# Patient Record
Sex: MALE | Race: WHITE | NOT HISPANIC OR LATINO | URBAN - METROPOLITAN AREA
[De-identification: names, ages, dates, MRNs, and addresses within clinical notes are randomized per-mention and may not be internally consistent; named-entity substitution may affect disease eponyms.]

---

## 2017-11-02 ENCOUNTER — EMERGENCY (EMERGENCY)
Facility: HOSPITAL | Age: 37
LOS: 1 days | Discharge: HOME | End: 2017-11-02
Attending: EMERGENCY MEDICINE
Payer: COMMERCIAL

## 2017-11-02 VITALS
HEIGHT: 72 IN | HEART RATE: 99 BPM | OXYGEN SATURATION: 95 % | RESPIRATION RATE: 18 BRPM | TEMPERATURE: 98 F | SYSTOLIC BLOOD PRESSURE: 119 MMHG | DIASTOLIC BLOOD PRESSURE: 81 MMHG | WEIGHT: 250 LBS

## 2017-11-02 PROCEDURE — 99283 EMERGENCY DEPT VISIT LOW MDM: CPT | Mod: 25

## 2017-11-02 PROCEDURE — 73030 X-RAY EXAM OF SHOULDER: CPT | Mod: 26,RT

## 2017-11-02 PROCEDURE — 99053 MED SERV 10PM-8AM 24 HR FAC: CPT

## 2017-11-02 PROCEDURE — 73030 X-RAY EXAM OF SHOULDER: CPT
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
## 2017-11-02 NOTE — ED PROVIDER NOTE - LOCATION
shoulder/FROM, N/V intact, 5+ strength RUE, - edema, - clavicular tenderness, - scapula tenderness, - obvious deformities

## 2017-11-02 NOTE — ED PROVIDER NOTE - PROGRESS NOTE DETAILS
xray: shows no acute fracture xray: shows no acute fracture. PT denies pain at this time. - ac tenderness. PT OK to d/c and follow up with Orthopedic.

## 2017-11-02 NOTE — ED PROVIDER NOTE - ATTENDING CONTRIBUTION TO CARE
37y old , injury to shoulder, previous h/o same, distal sensory, motor and capillary , pain control, xray review, close follow up
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
## 2018-03-09 ENCOUNTER — EMERGENCY (EMERGENCY)
Facility: HOSPITAL | Age: 38
LOS: 1 days | Discharge: HOME | End: 2018-03-09
Attending: EMERGENCY MEDICINE | Admitting: EMERGENCY MEDICINE
Payer: COMMERCIAL

## 2018-03-09 VITALS
SYSTOLIC BLOOD PRESSURE: 155 MMHG | TEMPERATURE: 98 F | OXYGEN SATURATION: 100 % | RESPIRATION RATE: 18 BRPM | DIASTOLIC BLOOD PRESSURE: 97 MMHG | HEART RATE: 71 BPM

## 2018-03-09 PROCEDURE — 99283 EMERGENCY DEPT VISIT LOW MDM: CPT | Mod: 25

## 2018-03-09 PROCEDURE — 73000 X-RAY EXAM OF COLLAR BONE: CPT

## 2018-03-09 PROCEDURE — 73030 X-RAY EXAM OF SHOULDER: CPT | Mod: 26,RT

## 2018-03-09 PROCEDURE — 73030 X-RAY EXAM OF SHOULDER: CPT

## 2018-03-09 PROCEDURE — 73000 X-RAY EXAM OF COLLAR BONE: CPT | Mod: 26,RT

## 2018-03-09 PROCEDURE — 99053 MED SERV 10PM-8AM 24 HR FAC: CPT

## 2018-03-09 RX ORDER — IBUPROFEN 200 MG
1 TABLET ORAL
Qty: 20 | Refills: 0
Start: 2018-03-09 | End: 2018-03-13

## 2018-03-09 RX ORDER — IBUPROFEN 200 MG
600 TABLET ORAL ONCE
Qty: 0 | Refills: 0 | Status: COMPLETED | OUTPATIENT
Start: 2018-03-09 | End: 2018-03-09

## 2018-03-09 RX ORDER — IBUPROFEN 200 MG
1 TABLET ORAL
Qty: 20 | Refills: 0
Start: 2018-03-09 | End: 2019-11-17

## 2018-03-09 RX ADMIN — Medication 600 MILLIGRAM(S): at 05:33

## 2018-03-09 NOTE — ED ADULT NURSE NOTE - OBJECTIVE STATEMENT
PT with PMHx of Rt shoulder separation presents to the ED with Rt shoulder pain S/P falling directly on RT shoulder. PT states that he slipped on ice while at worked and landed directly on his shoulder. PT presents to the ED with Rt shoulder pain S/P falling directly on RT shoulder. PT states that he slipped on ice while at worked and landed directly on his shoulder. Patient denies LOC, limited ROM noted, pain 6/10. Reports HX of similar episodes.

## 2018-03-09 NOTE — ED PROCEDURE NOTE - ATTENDING CONTRIBUTION TO CARE
I personally saw the patient with the PA, and completed the key components of the history and physical exam. I then discussed the management plan with the PA.  I also agree with procedure as documented

## 2018-03-09 NOTE — ED PROVIDER NOTE - MEDICAL DECISION MAKING DETAILS
TX pain, xray shoulder, likely DC home pending neg xray, with NSAIDs, follow up to ortho and PCP, pt informed that radiology results can change after official read, educated about proper follow up, tx, and when to return to the ED if needed. PT verbalizes that he understands all DX, information, and instructions. TX pain, xray shoulder, likely DC home pending neg xray, with NSAIDs, follow up to ortho sling,  and PCP, pt informed that radiology results can change after official read, educated about proper follow up, tx, and when to return to the ED if needed. PT verbalizes that he understands all DX, information, and instructions.

## 2018-03-09 NOTE — ED PROVIDER NOTE - OBJECTIVE STATEMENT
PT with PMHx of Rt shoulder separation presents to the ED with Rt shoulder pain S/P falling directly on RT shoulder. PT states that he slipped on ice while at worked and landed directly on his shoulder. PT states that he did nothing to make it better and that it progressively got worse so he came to the ED. PT denies Head trama, LOC, neck pain, back pain, numbness, tingling, weakness's, N/V, change in eye sight.

## 2018-03-09 NOTE — ED PROVIDER NOTE - ATTENDING CONTRIBUTION TO CARE
I personally saw the patient with the PA, and completed the key components of the history and physical exam. I then discussed the management plan with the PA.   gen in nad resp cleear caradac no murmur abd sof tmsk + ttp right anterior shoulder no signs septic joint

## 2018-03-09 NOTE — ED PROCEDURE NOTE - PROCEDURE ADDITIONAL DETAILS
Pt informed of proper sling use, and the importance of proper use to minimize sequale of sling usage.

## 2018-03-09 NOTE — ED PROCEDURE NOTE - CPROC ED POST PROC CARE GUIDE1
Instructed patient/caregiver to follow-up with primary care physician./Instructed patient/caregiver regarding signs and symptoms of infection./Keep the cast/splint/dressing clean and dry./Verbal/written post procedure instructions were given to patient/caregiver./Elevate the injured extremity as instructed.

## 2018-03-09 NOTE — ED PROVIDER NOTE - CHPI ED SYMPTOMS NEG
no numbness/no abrasion/no back pain/no fever/no tingling/no weakness/no difficulty bearing weight/no bruising/no deformity
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
## 2018-03-10 NOTE — ED CLERICAL - CLERICAL COMMENTS
Chart accessed for follow-up call, self phone number is [facility] Department 3rd.

## 2018-10-04 ENCOUNTER — EMERGENCY (EMERGENCY)
Facility: HOSPITAL | Age: 38
LOS: 1 days | Discharge: HOME | End: 2018-10-04
Attending: STUDENT IN AN ORGANIZED HEALTH CARE EDUCATION/TRAINING PROGRAM
Payer: COMMERCIAL

## 2018-10-04 VITALS
OXYGEN SATURATION: 96 % | SYSTOLIC BLOOD PRESSURE: 120 MMHG | HEART RATE: 84 BPM | DIASTOLIC BLOOD PRESSURE: 85 MMHG | WEIGHT: 229.94 LBS | RESPIRATION RATE: 16 BRPM | TEMPERATURE: 98 F | HEIGHT: 72 IN

## 2018-10-04 PROBLEM — S43.006A UNSPECIFIED DISLOCATION OF UNSPECIFIED SHOULDER JOINT, INITIAL ENCOUNTER: Chronic | Status: ACTIVE | Noted: 2017-11-02

## 2018-10-04 PROCEDURE — 99283 EMERGENCY DEPT VISIT LOW MDM: CPT | Mod: 25

## 2018-10-04 PROCEDURE — 90715 TDAP VACCINE 7 YRS/> IM: CPT

## 2018-10-04 PROCEDURE — 99053 MED SERV 10PM-8AM 24 HR FAC: CPT

## 2018-10-04 PROCEDURE — 90471 IMMUNIZATION ADMIN: CPT

## 2018-10-04 RX ORDER — TETANUS TOXOID, REDUCED DIPHTHERIA TOXOID AND ACELLULAR PERTUSSIS VACCINE, ADSORBED 5; 2.5; 8; 8; 2.5 [IU]/.5ML; [IU]/.5ML; UG/.5ML; UG/.5ML; UG/.5ML
0.5 SUSPENSION INTRAMUSCULAR ONCE
Qty: 0 | Refills: 0 | Status: COMPLETED | OUTPATIENT
Start: 2018-10-04 | End: 2018-10-04

## 2018-10-04 RX ORDER — IBUPROFEN 200 MG
800 TABLET ORAL ONCE
Qty: 0 | Refills: 0 | Status: COMPLETED | OUTPATIENT
Start: 2018-10-04 | End: 2018-10-04

## 2018-10-04 RX ADMIN — Medication 800 MILLIGRAM(S): at 04:18

## 2018-10-04 RX ADMIN — TETANUS TOXOID, REDUCED DIPHTHERIA TOXOID AND ACELLULAR PERTUSSIS VACCINE, ADSORBED 0.5 MILLILITER(S): 5; 2.5; 8; 8; 2.5 SUSPENSION INTRAMUSCULAR at 04:18
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
## 2018-10-04 NOTE — ED PROVIDER NOTE - PHYSICAL EXAMINATION
MOUTH: Abrasion to right upper lip. no loose teeth. oropharynx unremarkable.   NOSE: no epistaxis.  EYES: NITHYA. EOMI bilaterally  EARS: no hemotympaneum   LEFT FOOT: no apparent deformities. + ttp over left posterior tibial region. no calf tenderness. + dorsiflexion with calf squeeze. 2+ DP/PT brisk caprefill
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
## 2019-11-13 ENCOUNTER — EMERGENCY (EMERGENCY)
Facility: HOSPITAL | Age: 39
LOS: 1 days | Discharge: HOME | End: 2019-11-13
Attending: EMERGENCY MEDICINE
Payer: COMMERCIAL

## 2019-11-13 VITALS
WEIGHT: 250 LBS | HEART RATE: 74 BPM | HEIGHT: 73 IN | TEMPERATURE: 98 F | RESPIRATION RATE: 16 BRPM | DIASTOLIC BLOOD PRESSURE: 93 MMHG | OXYGEN SATURATION: 98 % | SYSTOLIC BLOOD PRESSURE: 148 MMHG

## 2019-11-13 PROCEDURE — 99053 MED SERV 10PM-8AM 24 HR FAC: CPT

## 2019-11-13 PROCEDURE — 90471 IMMUNIZATION ADMIN: CPT

## 2019-11-13 PROCEDURE — 99285 EMERGENCY DEPT VISIT HI MDM: CPT | Mod: 25

## 2019-11-13 PROCEDURE — 90715 TDAP VACCINE 7 YRS/> IM: CPT

## 2019-11-13 PROCEDURE — 73080 X-RAY EXAM OF ELBOW: CPT | Mod: 26,RT

## 2019-11-13 PROCEDURE — 73080 X-RAY EXAM OF ELBOW: CPT

## 2019-11-13 PROCEDURE — 99283 EMERGENCY DEPT VISIT LOW MDM: CPT

## 2019-11-13 RX ORDER — TETANUS TOXOID, REDUCED DIPHTHERIA TOXOID AND ACELLULAR PERTUSSIS VACCINE, ADSORBED 5; 2.5; 8; 8; 2.5 [IU]/.5ML; [IU]/.5ML; UG/.5ML; UG/.5ML; UG/.5ML
0.5 SUSPENSION INTRAMUSCULAR ONCE
Refills: 0 | Status: COMPLETED | OUTPATIENT
Start: 2019-11-13 | End: 2019-11-13

## 2019-11-13 RX ORDER — IBUPROFEN 200 MG
600 TABLET ORAL ONCE
Refills: 0 | Status: COMPLETED | OUTPATIENT
Start: 2019-11-13 | End: 2019-11-13

## 2019-11-13 RX ADMIN — Medication 600 MILLIGRAM(S): at 23:27

## 2019-11-13 RX ADMIN — TETANUS TOXOID, REDUCED DIPHTHERIA TOXOID AND ACELLULAR PERTUSSIS VACCINE, ADSORBED 0.5 MILLILITER(S): 5; 2.5; 8; 8; 2.5 SUSPENSION INTRAMUSCULAR at 23:26
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
## 2019-11-13 NOTE — ED PROVIDER NOTE - CARE PROVIDER_API CALL
Evangelist Peterson (DO)  [facility]  [address]  [place] [address]  Phone: [phone]  Fax: [phone]  Follow Up Time:

## 2019-11-13 NOTE — ED PROVIDER NOTE - PATIENT PORTAL LINK FT
You can access the FollowMyHealth Patient Portal offered by [facility] by registering at the following website: [email or web address]. By joining [employer]’s FollowMyHealth portal, you will also be able to view your health information using other applications (apps) compatible with our system.
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
## 2019-11-13 NOTE — ED PROVIDER NOTE - NSFOLLOWUPINSTRUCTIONS_ED_ALL_ED_FT
rest ice and elevate   motrin every 6 hours to decrease inflammation  for continued symptoms please follow with orthopedics

## 2019-11-13 NOTE — ED PROVIDER NOTE - MUSCULOSKELETAL, MLM
right elbow no bruising abrasions or swelling. no palpable deformity or effusion. + ttp over medial epicondyle FROM 5/5 muscle strength. 2+ radial pulse knees with bilateral abrasions. no palpable deformity FROM . range of motion is not limited, no muscle or joint tenderness
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
## 2020-09-07 ENCOUNTER — EMERGENCY (EMERGENCY)
Facility: HOSPITAL | Age: 40
LOS: 1 days | Discharge: HOME | End: 2020-09-07
Attending: EMERGENCY MEDICINE
Payer: COMMERCIAL

## 2020-09-07 VITALS — SYSTOLIC BLOOD PRESSURE: 129 MMHG | DIASTOLIC BLOOD PRESSURE: 90 MMHG | HEART RATE: 78 BPM

## 2020-09-07 VITALS
TEMPERATURE: 98 F | HEART RATE: 86 BPM | DIASTOLIC BLOOD PRESSURE: 106 MMHG | OXYGEN SATURATION: 95 % | SYSTOLIC BLOOD PRESSURE: 159 MMHG | RESPIRATION RATE: 18 BRPM

## 2020-09-07 PROCEDURE — 99053 MED SERV 10PM-8AM 24 HR FAC: CPT

## 2020-09-07 PROCEDURE — 73660 X-RAY EXAM OF TOE(S): CPT

## 2020-09-07 PROCEDURE — 99283 EMERGENCY DEPT VISIT LOW MDM: CPT

## 2020-09-07 PROCEDURE — 73660 X-RAY EXAM OF TOE(S): CPT | Mod: 26,LT

## 2020-09-07 RX ORDER — IBUPROFEN 200 MG
600 TABLET ORAL ONCE
Refills: 0 | Status: COMPLETED | OUTPATIENT
Start: 2020-09-07 | End: 2020-09-07

## 2020-09-07 RX ADMIN — Medication 600 MILLIGRAM(S): at 02:11

## 2020-09-07 NOTE — ED PROVIDER NOTE - CARE PROVIDER_API CALL
Randy, Peter J  PODIATRIC [facility]ICINE [facility] SURGERY  [address] [place] [address]  Phone: [phone]  Fax: [phone]  Follow Up Time:

## 2020-09-07 NOTE — ED ADULT TRIAGE NOTE - CHIEF COMPLAINT QUOTE
patient states that he believes he kicked a curb while at work and broke the 2nd digit on his left toe, patient states that he has pain when he walks. states that it is turning purple
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
## 2020-09-07 NOTE — ED PROVIDER NOTE - CARE PLAN
Principal Discharge DX:	Closed displaced fracture of phalanx of toe of left foot, unspecified toe, initial encounter Principal Discharge DX:	Contusion of toe of left foot, unspecified toe, initial encounter

## 2020-09-07 NOTE — ED PROVIDER NOTE - PATIENT PORTAL LINK FT
You can access the FollowMyHealth Patient Portal offered by [facility] by registering at the following website: [email or web address]. By joining [employer]’s FollowMyHealth portal, you will also be able to view your health information using other applications (apps) compatible with our system.

## 2020-09-07 NOTE — ED PROVIDER NOTE - PROGRESS NOTE DETAILS
x-ray is reviewed by dr brown , no acute finding .given the surgical to the pt to wear . and f.u podiatry

## 2020-09-07 NOTE — ED PROVIDER NOTE - PRINCIPAL DIAGNOSIS
Closed displaced fracture of phalanx of toe of left foot, unspecified toe, initial encounter Contusion of toe of left foot, unspecified toe, initial encounter

## 2020-09-07 NOTE — ED PROVIDER NOTE - ATTENDING CONTRIBUTION TO CARE
I personally saw the patient with the PA, and completed the key components of the history and physical exam. I then discussed the management plan with the PA.   gen in nad resp clear cardiac no murmur abd soft msk + ttp left 2nd digit LLE neurovasc intact   rice therapy, risk of occult fx explained

## 2020-09-07 NOTE — ED PROVIDER NOTE - NSFOLLOWUPINSTRUCTIONS_ED_ALL_ED_FT
rest , use the surgical shoes as given to walk , apply cold pack and use the over the counter Advil as need it for the pain and follow instruction   call and follow up with podiatry within 2-3 days   you will get call back if the official xray reading is different as need told to you

## 2020-09-07 NOTE — ED ADULT NURSE NOTE - OBJECTIVE STATEMENT
patient a&ox4, VSS except slightly hypertensive, came in w/ complaints of L 2nd toe pain, patient believes he kicked a curb while @ work, noticed his toe was turning purple. no complaints of sob, n/v/d, cp or ha. pt pending Xray of foot.

## 2020-09-07 NOTE — ED PROVIDER NOTE - CONDITION AT DISCHARGE:
Detail Level: Detailed Include Pregnancy/Lactation Warning?: No Doxycycline Pregnancy And Lactation Text: This medication is Pregnancy Category D and not consider safe during pregnancy. It is also excreted in breast milk but is considered safe for shorter treatment courses. Isotretinoin Pregnancy And Lactation Text: This medication is Pregnancy Category X and is considered extremely dangerous during pregnancy. It is unknown if it is excreted in breast milk. Birth Control Pills Pregnancy And Lactation Text: This medication should be avoided if pregnant and for the first 30 days post-partum. Tetracycline Pregnancy And Lactation Text: This medication is Pregnancy Category D and not consider safe during pregnancy. It is also excreted in breast milk. Topical Retinoid counseling:  Patient advised to apply a pea-sized amount only at bedtime and wait 30 minutes after washing their face before applying. If too drying, patient may add a non-comedogenic moisturizer. The patient verbalized understanding of the proper use and possible adverse effects of retinoids. All of the patient's questions and concerns were addressed. Topical Clindamycin Counseling: Patient counseled that this medication may cause skin irritation or allergic reactions. In the event of skin irritation, the patient was advised to reduce the amount of the drug applied or use it less frequently. The patient verbalized understanding of the proper use and possible adverse effects of clindamycin. All of the patient's questions and concerns were addressed. Topical Sulfur Applications Pregnancy And Lactation Text: This medication is Pregnancy Category C and has an unknown safety profile during pregnancy. It is unknown if this topical medication is excreted in breast milk. Azithromycin Pregnancy And Lactation Text: This medication is considered safe during pregnancy and is also secreted in breast milk. Spironolactone Counseling: Patient advised regarding risks of diarrhea, abdominal pain, hyperkalemia, birth defects (for female patients), liver toxicity and renal toxicity. The patient may need blood work to monitor liver and kidney function and potassium levels while on therapy. The patient verbalized understanding of the proper use and possible adverse effects of spironolactone. All of the patient's questions and concerns were addressed. Benzoyl Peroxide Counseling: Patient counseled that medicine may cause skin irritation and bleach clothing. In the event of skin irritation, the patient was advised to reduce the amount of the drug applied or use it less frequently. The patient verbalized understanding of the proper use and possible adverse effects of benzoyl peroxide. All of the patient's questions and concerns were addressed. Dapsone Counseling: I discussed with the patient the risks of dapsone including but not limited to hemolytic anemia, agranulocytosis, rashes, methemoglobinemia, kidney failure, peripheral neuropathy, headaches, GI upset, and liver toxicity. Patients who start dapsone require monitoring including baseline LFTs and weekly CBCs for the first month, then every month thereafter. The patient verbalized understanding of the proper use and possible adverse effects of dapsone. All of the patient's questions and concerns were addressed. Topical Retinoid Pregnancy And Lactation Text: This medication is Pregnancy Category C. It is unknown if this medication is excreted in breast milk. Topical Clindamycin Pregnancy And Lactation Text: This medication is Pregnancy Category B and is considered safe during pregnancy. It is unknown if it is excreted in breast milk. High Dose Vitamin A Counseling: Side effects reviewed, pt to contact office should one occur. Bactrim Counseling:  I discussed with the patient the risks of sulfa antibiotics including but not limited to GI upset, allergic reaction, drug rash, diarrhea, dizziness, photosensitivity, and yeast infections. Rarely, more serious reactions can occur including but not limited to aplastic anemia, agranulocytosis, methemoglobinemia, blood dyscrasias, liver or kidney failure, lung infiltrates or desquamative/blistering drug rashes. Dapsone Pregnancy And Lactation Text: This medication is Pregnancy Category C and is not considered safe during pregnancy or breast feeding. Erythromycin Pregnancy And Lactation Text: This medication is Pregnancy Category B and is considered safe during pregnancy. It is also excreted in breast milk. Topical Sulfur Applications Counseling: Topical Sulfur Counseling: Patient counseled that this medication may cause skin irritation or allergic reactions. In the event of skin irritation, the patient was advised to reduce the amount of the drug applied or use it less frequently. The patient verbalized understanding of the proper use and possible adverse effects of topical sulfur application. All of the patient's questions and concerns were addressed. High Dose Vitamin A Pregnancy And Lactation Text: High dose vitamin A therapy is contraindicated during pregnancy and breast feeding. Spironolactone Pregnancy And Lactation Text: This medication can cause feminization of the male fetus and should be avoided during pregnancy. The active metabolite is also found in breast milk. Bactrim Pregnancy And Lactation Text: This medication is Pregnancy Category D and is known to cause fetal risk. It is also excreted in breast milk. Tazorac Counseling:  Patient advised that medication is irritating and drying. Patient may need to apply sparingly and wash off after an hour before eventually leaving it on overnight. The patient verbalized understanding of the proper use and possible adverse effects of tazorac. All of the patient's questions and concerns were addressed. Erythromycin Counseling:  I discussed with the patient the risks of erythromycin including but not limited to GI upset, allergic reaction, drug rash, diarrhea, increase in liver enzymes, and yeast infections. Minocycline Counseling: Patient advised regarding possible photosensitivity and discoloration of the teeth, skin, lips, tongue and gums. Patient instructed to avoid sunlight, if possible. When exposed to sunlight, patients should wear protective clothing, sunglasses, and sunscreen. The patient was instructed to call the office immediately if the following severe adverse effects occur:  hearing changes, easy bruising/bleeding, severe headache, or vision changes. The patient verbalized understanding of the proper use and possible adverse effects of minocycline. All of the patient's questions and concerns were addressed. Tetracycline Counseling: Patient counseled regarding possible photosensitivity and increased risk for sunburn. Patient instructed to avoid sunlight, if possible. When exposed to sunlight, patients should wear protective clothing, sunglasses, and sunscreen. The patient was instructed to call the office immediately if the following severe adverse effects occur:  hearing changes, easy bruising/bleeding, severe headache, or vision changes. The patient verbalized understanding of the proper use and possible adverse effects of tetracycline. All of the patient's questions and concerns were addressed. Patient understands to avoid pregnancy while on therapy due to potential birth defects. Doxycycline Counseling:  Patient counseled regarding possible photosensitivity and increased risk for sunburn. Patient instructed to avoid sunlight, if possible. When exposed to sunlight, patients should wear protective clothing, sunglasses, and sunscreen. The patient was instructed to call the office immediately if the following severe adverse effects occur:  hearing changes, easy bruising/bleeding, severe headache, or vision changes. The patient verbalized understanding of the proper use and possible adverse effects of doxycycline. All of the patient's questions and concerns were addressed. Birth Control Pills Counseling: Birth Control Pill Counseling: I discussed with the patient the potential side effects of OCPs including but not limited to increased risk of stroke, heart attack, thrombophlebitis, deep venous thrombosis, hepatic adenomas, breast changes, GI upset, headaches, and depression. The patient verbalized understanding of the proper use and possible adverse effects of OCPs. All of the patient's questions and concerns were addressed. Tazorac Pregnancy And Lactation Text: This medication is not safe during pregnancy. It is unknown if this medication is excreted in breast milk. Isotretinoin Counseling: Patient should get monthly blood tests, not donate blood, not drive at night if vision affected, not share medication, and not undergo elective surgery for 6 months after tx completed. Side effects reviewed, pt to contact office should one occur. Azithromycin Counseling:  I discussed with the patient the risks of azithromycin including but not limited to GI upset, allergic reaction, drug rash, diarrhea, and yeast infections. Benzoyl Peroxide Pregnancy And Lactation Text: This medication is Pregnancy Category C. It is unknown if benzoyl peroxide is excreted in breast milk. Improved
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
## 2021-03-03 NOTE — ED PROVIDER NOTE - RADIATION
Pt on covid call list, chart reviewed in preparation of call. Per chart pt was transferred to in pt hospice in [facility]. ( already a hospice pt). Did not call pt.
no radiation

## 2021-07-08 ENCOUNTER — EMERGENCY (EMERGENCY)
Facility: HOSPITAL | Age: 41
LOS: 1 days | Discharge: HOME | End: 2021-07-08
Attending: STUDENT IN AN ORGANIZED HEALTH CARE EDUCATION/TRAINING PROGRAM
Payer: COMMERCIAL

## 2021-07-08 VITALS
SYSTOLIC BLOOD PRESSURE: 147 MMHG | HEIGHT: 73 IN | RESPIRATION RATE: 18 BRPM | TEMPERATURE: 98 F | DIASTOLIC BLOOD PRESSURE: 100 MMHG | OXYGEN SATURATION: 98 % | HEART RATE: 97 BPM

## 2021-07-08 PROCEDURE — 73110 X-RAY EXAM OF WRIST: CPT

## 2021-07-08 PROCEDURE — 73110 X-RAY EXAM OF WRIST: CPT | Mod: 26,RT

## 2021-07-08 PROCEDURE — 99283 EMERGENCY DEPT VISIT LOW MDM: CPT

## 2021-07-08 PROCEDURE — 99053 MED SERV 10PM-8AM 24 HR FAC: CPT

## 2021-07-08 PROCEDURE — 99283 EMERGENCY DEPT VISIT LOW MDM: CPT | Mod: 25

## 2021-07-08 RX ORDER — IBUPROFEN 200 MG
600 TABLET ORAL ONCE
Refills: 0 | Status: DISCONTINUED | OUTPATIENT
Start: 2021-07-08 | End: 2021-07-12

## 2021-07-08 NOTE — ED PROVIDER NOTE - NSFOLLOWUPINSTRUCTIONS_ED_ALL_ED_FT
- Please follow up with your Primary Care Doctor in 1 - 2 days. If you cannot follow-up with your primary care doctor please return to the Emergency Department for any urgent issues.  - Seek immediate medical care for any new, worsening or concerning signs or symptoms.   - Follow up with Orthopedist   - Your blood pressure reading was high while in ED, please monitor your blood pressure at home and follow up with PMD.     - If you have difficulty following up, please call: [phone] or go to [email or web address] to obtain a [facility] doctor or specialist who takes your insurance in your area.    Feel better!     Wrist Pain, Adult    There are many things that can cause wrist pain. Some common causes include:  •An injury to the wrist.      •Using the joint too much.      •A condition that causes too much pressure to be put on a nerve in the wrist (carpal tunnel syndrome).      •Wear and tear of the joints that happens as a person gets older (osteoarthritis).      •A condition that causes swelling and stiffness in the joints (arthritis).      Sometimes, the cause of wrist pain is not known.    Often, the pain goes away when you follow your doctor's instructions for easing pain at home. This may include resting your wrist, icing your wrist, or using a splint or an elastic wrap for a short time. It is important to tell your doctor if your wrist pain does not go away.      Follow these instructions at home:    If you have a splint or elastic wrap:     •Wear the splint or wrap as told by your doctor. Take it off only as told by your doctor. Ask if you can take it off for bathing.    •Loosen the splint or wrap if your fingers:  •Tingle.      •Become numb.      •Turn cold and blue.        •Check the skin around the splint or wrap every day. Tell your doctor about any concerns.      •Keep the splint or wrap clean.    •If the splint or wrap is not waterproof:  •Do not let it get wet.      •Cover it with a watertight covering when you take a bath or shower.          Managing pain, stiffness, and swelling    •If told, put ice on the painful area. To do this:  •If you have a removable splint or wrap, take it off as told by your doctor.      •Put ice in a plastic bag.      •Place a towel between your skin and the bag or between your splint or wrap and the bag.      •Leave the ice on for 20 minutes, 2–3 times a day.        •Move your fingers often.      •Raise (elevate) the injured area above the level of your heart while you are sitting or lying down.      Activity     •Rest your wrist as told by your doctor.      •Return to your normal activities as told by your doctor. Ask your doctor what activities are safe for you.      •Ask your doctor when it is safe to drive if you have a splint or wrap on your wrist.      •Do exercises as told by your doctor.      General instructions     •Pay attention to any changes in your symptoms.      •Take over-the-counter and prescription medicines only as told by your doctor.      •Keep all follow-up visits as told by your doctor. This is important.        Contact a doctor if:    •You have a sudden, sharp pain in the wrist, hand, or arm that is different or new.      •The swelling or bruising on your wrist or hand gets worse.    •Your skin:  •Becomes red.      •Gets a rash.      •Has open sores.        •Your pain does not get better.      •Your pain gets worse.      •You have a fever or chills.        Get help right away if:    •You lose feeling in your fingers or hand.      •Your fingers turn white, very red, or cold and blue.      •You cannot move your fingers.        Summary    •There are many things that can cause wrist pain.      •It is important to tell your doctor if your wrist pain does not go away.      •You may need to wear a splint or a wrap for a short period of time.      •Return to your normal activities as told by your doctor. Ask your doctor what activities are safe for you.      This information is not intended to replace advice given to you by your health care provider. Make sure you discuss any questions you have with your health care provider.

## 2021-07-08 NOTE — ED PROVIDER NOTE - PATIENT PORTAL LINK FT
You can access the FollowMyHealth Patient Portal offered by [facility] by registering at the following website: [email or web address]. By joining [employer]’s FollowMyHealth portal, you will also be able to view your health information using other applications (apps) compatible with our system.

## 2021-07-08 NOTE — ED PROVIDER NOTE - CARE PROVIDER_API CALL
Mari Tinajero)  Orthopedics  [address] [place] [address], [address]  [place] [address]  Phone: [phone]  Fax: [phone]  Follow Up Time: 1-3 Days

## 2021-07-08 NOTE — ED PROVIDER NOTE - CHPI ED SYMPTOMS NEG
no abrasion/no back pain/no deformity/no fever/no numbness/no stiffness/no tingling/no weakness/no bruising

## 2021-07-08 NOTE — ED PROVIDER NOTE - PHYSICAL EXAMINATION
Vital signs noted, see flowsheet.  General: NAD, well appearing and non-toxic.  HEENT: NC/AT. MMM.   Neck: Soft and supple  Cardiac: RRR. +S1/S2. Peripheral pulses 2+ and symmetric b/l.   Respiratory: Speaking in full sentences, no evidence of respiratory distress. Lungs CTA b/l, no wheezes/rhonchi/rales/stridor.   Skin: Normal color for race, no evidence of rash, ecchymosis, cyanosis or jaundice.   Neuro: Awake, alert and oriented to person/place/time/situation. Moves all extremities spontaneously and symmetrically.  No focal deficit  Psych: Normal affect     RUE: No edema or ecchymosis. Minimally tender over 2nd proximal MCP. No snuffbox tenderness. + FROM.  strong, no sensory deficits. cap refill intact

## 2021-07-08 NOTE — ED PROVIDER NOTE - CLINICAL SUMMARY MEDICAL DECISION MAKING FREE TEXT BOX
41 y/o M with PMHX ankle fracture, shoulder separation presents to ED c/o right wrist pain after lifting heavy patient while at work. Xray reviewed no fx or dislocation noted, splinted for comfort. Follow up outpatient orthopedist. Also with asymptomatic hypertension, Pt informed of elevated blood pressure reading while in ED, advised to monitor BP and follow up with PMD.

## 2021-07-08 NOTE — ED PROVIDER NOTE - ATTENDING CONTRIBUTION TO CARE
39yo male with pmh of ankle fracture, shoulder separation presents for right wrist pain after lifting heavy patient while at work. No direct trauma or fall. Took no analgesics pta. Pain worsens with movement and making tight fist. Pt denies fevers/chills, ha, loc, focal neuro deficits, cp/sob/palp, cough, abd pain/n/v/d, urinary symptoms, recent travel and sick contacts.  Const: Awake, alert and oriented. In no acute distress. Well appearing.  HEENT: NC/AT. Moist mucous membranes.  Eyes: No scleral icterus. EOMI.  Neck:. Soft and supple. Full ROM without pain.  Cardiac: Regular rate and regular rhythm. +S1/S2. Peripheral pulses 2+ and symmetric. No LE edema.  Resp: Speaking in full sentences. No evidence of respiratory distress. No wheezes, rales or rhonchi.  Abd: Soft, non-tender, non-distended. Normal bowel sounds in all 4 quadrants. No guarding or rebound.  Msk: Spine midline and non-tender. No CVAT.  No edema or ecchymosis. Minimally tender over right 2nd proximal MCP. No snuffbox tenderness. + FROM.  strong, no sensory deficits. cap refill intact  Skin: No rashes, abrasions or lacerations.  Lymph: No cervical lymphadenopathy.  Neuro: Awake, alert & oriented x 3. Moves all extremities symmetrically.  splint for comfort, no acute findings on xray, follow up

## 2021-07-08 NOTE — ED PROVIDER NOTE - OBJECTIVE STATEMENT
41 y/o M with PMHX ankle fracture, shoulder separation presents to ED c/o right wrist pain after lifting heavy patient while at work. No direct trauma or fall. Took no analgesics. Pain worsens with movement and making tight fist.

## 2022-02-18 NOTE — ED PROVIDER NOTE - CARE PLAN
CARDIAC CATHETERIZATION REPORT    : Paula Carrasco MD     Date of procedure: 2/18/22    Indication:  1. Abnormal stress test and HFrEF    Procedures:  Left heart catheterization and Coronary angiogram     Sedation/analgesia:  Midazolam IV     Time sedation was administered: 1124. I was present in the room when sedation was administered. Procedure end time: 5852  Time spent with face to face monitoring of moderate sedation: 18 minutes    Brief history:  62-year-old  male with hypertension, CASIE, hypercholesterolemia and no angina who underwent a Lexiscan stress MPI in October 2021 (ordered by PCP) revealing an LVEF of 45% with a small distal inferior reversible defect. A TTE which was performed at an outside facility was interpreted with LVEF 40 to 50%. He was referred for coronary angiography. Description of procedure: The patient presented to the Cath Lab in a(n) elective fashion. The patient was prepped and draped in a sterile manner. Timeout, airway and ASA assessment were completed. Local anesthesia with 1% lidocaine was administered and sedation/analgesia were administered as above. Access was obtained using the modified Seldinger technique and a micropuncture needle in the right radial artery. A 6 Ecuadorean slender sheath was inserted over a wire. Diagnostic angiography was performed using 5F Bowen diagnostic catheters. Coronary anatomy:  Dominance: Left  1. Left main: Angiographically unremarkable  2. Left anterior descending: Large wraparound vessel with small D1 and large D2. There is mild diffuse disease. The ostium of D2 has a focal ~50% stenosis. 3. Ramus intermedius: This is a large vessel that supplies most of the lateral wall and has minimal luminal irregularities  4. Left circumflex: This is a large codominant vessel with a large OM1, small OM 2, medium sized LPL and L PDA. Has mild diffuse disease  5. Right coronary artery:  This is a large vessel with a large RV marginal branch and a small vessel in the proximal interventricular groove      Hemodynamics:  LVEDP 12  Ao: 103/70 with a mean of 86      Hemostasis:  Transradial band was applied for patent arterial hemostasis. Complications: None  Estimated blood loss: 10 mL  Air kerma: 407 mGy  Contrast used: 70 mL    Conclusions:  1. Mild epicardial CAD  2. Normal left filling pressure  3. Normal LV systolic function by single-plane angiography      Recommendations:  1. Continue aspirin 81 mg daily  2. Continue amlodipine 10 mg daily  3. Discontinue ezetimibe and start rosuvastatin 40 mg daily  4. Discontinue atenolol and start lisinopril 10 mg daily  5. BMP in 1 week  6. TTE as outpatient due to the discrepancy of today's findings and the reported outside study  7. Follow-up with me in the office in 1 to 2 months.       Gunner Fuentes MD, [facility] [place]  Interventional Cardiology/Structural Heart Disease  Office: [phone]  Coordinator: Kitty Gannon Principal Discharge DX:	Right elbow pain  Secondary Diagnosis:	Knee injury

## 2022-03-19 ENCOUNTER — EMERGENCY (EMERGENCY)
Facility: HOSPITAL | Age: 42
LOS: 1 days | Discharge: HOME | End: 2022-03-19
Attending: STUDENT IN AN ORGANIZED HEALTH CARE EDUCATION/TRAINING PROGRAM
Payer: COMMERCIAL

## 2022-03-19 VITALS
HEART RATE: 92 BPM | HEIGHT: 73 IN | WEIGHT: 210.1 LBS | SYSTOLIC BLOOD PRESSURE: 153 MMHG | RESPIRATION RATE: 18 BRPM | TEMPERATURE: 99 F | DIASTOLIC BLOOD PRESSURE: 97 MMHG | OXYGEN SATURATION: 99 %

## 2022-03-19 PROCEDURE — 99053 MED SERV 10PM-8AM 24 HR FAC: CPT

## 2022-03-19 PROCEDURE — 99283 EMERGENCY DEPT VISIT LOW MDM: CPT

## 2022-03-19 RX ORDER — IBUPROFEN 200 MG
600 TABLET ORAL ONCE
Refills: 0 | Status: COMPLETED | OUTPATIENT
Start: 2022-03-19 | End: 2022-03-19

## 2022-03-19 RX ORDER — METHOCARBAMOL 500 MG/1
1 TABLET, FILM COATED ORAL
Qty: 15 | Refills: 0
Start: 2022-03-19

## 2022-03-19 RX ORDER — LIDOCAINE 4 G/100G
1 CREAM TOPICAL ONCE
Refills: 0 | Status: COMPLETED | OUTPATIENT
Start: 2022-03-19 | End: 2022-03-19

## 2022-03-19 RX ORDER — ACETAMINOPHEN 500 MG
975 TABLET ORAL ONCE
Refills: 0 | Status: COMPLETED | OUTPATIENT
Start: 2022-03-19 | End: 2022-03-19

## 2022-03-19 RX ORDER — IBUPROFEN 200 MG
1 TABLET ORAL
Qty: 20 | Refills: 0
Start: 2022-03-19

## 2022-03-19 RX ADMIN — Medication 600 MILLIGRAM(S): at 05:37

## 2022-03-19 RX ADMIN — Medication 975 MILLIGRAM(S): at 05:37

## 2022-03-19 RX ADMIN — LIDOCAINE 1 PATCH: 4 CREAM TOPICAL at 05:38

## 2022-03-19 NOTE — ED ADULT NURSE NOTE - OBJECTIVE STATEMENT
c/o R sided rib pain while reaching back from the front to back seat to get his flashlight. patient denies trauma. tender to touch.

## 2022-03-19 NOTE — ED PROVIDER NOTE - ATTENDING CONTRIBUTION TO CARE
42 y/o M no pmhx presents c/o right sided back pain after moving stuff /reaching in car felt a pop and has been having pain since that time. Pt denies any fall or trauma. Pt denies bladder/bowel incontinence. saddle anesthesia, neuro deficits, IVDU, fevers/chills, ha, loc, focal neuro deficits, cp/sob/palp, cough, abd pain/n/v/d, urinary symptoms, recent travel.  Const: Awake, alert and oriented. In no acute distress. Well appearing.  HEENT: NC/AT. Moist mucous membranes.  Eyes: No scleral icterus. EOMI.  Neck:. Soft and supple. Full ROM without pain.  Cardiac: Regular rate and regular rhythm. +S1/S2. Peripheral pulses 2+ and symmetric. No LE edema.  Resp: Speaking in full sentences. No evidence of respiratory distress. No wheezes, rales or rhonchi.  Abd: Soft, non-tender, non-distended. Normal bowel sounds in all 4 quadrants. No guarding or rebound.  Back: Spine midline and non-tender. No CVAT. paraspinal ttp  Skin: No rashes, abrasions or lacerations.  Lymph: No cervical lymphadenopathy.  Neuro: Awake, alert & oriented x 3. Moves all extremities symmetrically.  Pt denies bladder/bowel incontinence. saddle anesthesia, neuro deficits. No midline spine tenderness, no step offs. + Paraspinal ttp. Likely msk pain, analgesia, reassess

## 2022-03-19 NOTE — ED PROVIDER NOTE - PATIENT PORTAL LINK FT
You can access the FollowMyHealth Patient Portal offered by [facility] by registering at the following website: [email or web address]. By joining [employer]’s FollowMyHealth portal, you will also be able to view your health information using other applications (apps) compatible with our system.

## 2022-03-19 NOTE — ED PROVIDER NOTE - CLINICAL SUMMARY MEDICAL DECISION MAKING FREE TEXT BOX
Pt denies bladder/bowel incontinence. saddle anesthesia, neuro deficits. No midline spine tenderness, no step offs. + Paraspinal ttp. Likely msk pain, analgesia, reassess

## 2022-03-19 NOTE — ED ADULT TRIAGE NOTE - CHIEF COMPLAINT QUOTE
Pt ambulated into ED c/o right sided back pain after reaching into the back of his car while working this evening.

## 2022-03-19 NOTE — ED PROVIDER NOTE - OBJECTIVE STATEMENT
42 y/o M no pmhx presents c/o right sided back pain after moving stuff /reaching in car felt a pop and has been having pain since that time. incident occurred while working as . denies fall or direct trauma. pain is right sided, worse with movement or bending. denies radiation down legs, numbness, tingling, weakness, bowel/bladder incontinence , saddle anesthesia, abd pain.

## 2022-03-19 NOTE — ED PROVIDER NOTE - NS ED ATTENDING STATEMENT MOD
This was a shared visit with the RONNA. I reviewed and verified the documentation and independently performed the documented:

## 2023-04-08 ENCOUNTER — EMERGENCY (EMERGENCY)
Facility: HOSPITAL | Age: 43
LOS: 1 days | Discharge: HOME | End: 2023-04-08
Attending: EMERGENCY MEDICINE
Payer: COMMERCIAL

## 2023-04-08 VITALS
RESPIRATION RATE: 16 BRPM | TEMPERATURE: 98 F | WEIGHT: 179.9 LBS | SYSTOLIC BLOOD PRESSURE: 155 MMHG | DIASTOLIC BLOOD PRESSURE: 93 MMHG | OXYGEN SATURATION: 99 % | HEART RATE: 88 BPM

## 2023-04-08 PROCEDURE — 73130 X-RAY EXAM OF HAND: CPT

## 2023-04-08 PROCEDURE — 99053 MED SERV 10PM-8AM 24 HR FAC: CPT

## 2023-04-08 PROCEDURE — 99284 EMERGENCY DEPT VISIT MOD MDM: CPT

## 2023-04-08 PROCEDURE — 73110 X-RAY EXAM OF WRIST: CPT

## 2023-04-08 RX ORDER — IBUPROFEN 200 MG
600 TABLET ORAL ONCE
Refills: 0 | Status: DISCONTINUED | OUTPATIENT
Start: 2023-04-08 | End: 2023-04-08

## 2023-04-08 RX ORDER — IBUPROFEN 200 MG
600 TABLET ORAL ONCE
Refills: 0 | Status: COMPLETED | OUTPATIENT
Start: 2023-04-08 | End: 2023-04-08

## 2023-04-08 RX ADMIN — Medication 50 MILLIGRAM(S): at 23:54

## 2023-04-08 RX ADMIN — Medication 600 MILLIGRAM(S): at 23:54
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
## 2023-04-08 NOTE — ED PROVIDER NOTE - CONDITION AT DISCHARGE:
"Anesthesia Transfer of Care Note    Patient: Melani Sloan    Procedure(s) Performed: Procedure(s) (LRB):  REPLACEMENT, BATTERY, NEUROSTIMULATOR, VAGAL (N/A)    Patient location: [facility]    Anesthesia Type: general    Transport from OR: Transported from OR on room air with adequate spontaneous ventilation    Post pain: adequate analgesia    Post assessment: no apparent anesthetic complications    Post vital signs: stable    Level of consciousness: awake    Nausea/Vomiting: no nausea/vomiting    Complications: none    Transfer of care protocol was followed      Last vitals:   Visit Vitals  /68 (BP Location: Right arm, Patient Position: Lying)   Pulse (!) 57   Temp 36.7 °C (98.1 °F) (Temporal)   Resp 20   Ht 5' 5" (1.651 m)   Wt 57.2 kg (126 lb)   LMP 03/27/2023 (Exact Date)   SpO2 97%   Breastfeeding No   BMI 20.97 kg/m²     " Improved
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
## 2023-04-08 NOTE — ED PROVIDER NOTE - PATIENT PORTAL LINK FT
You can access the FollowMyHealth Patient Portal offered by [facility] by registering at the following website: [email or web address]. By joining [employer]’s FollowMyHealth portal, you will also be able to view your health information using other applications (apps) compatible with our system.

## 2023-04-08 NOTE — ED PROVIDER NOTE - NSFOLLOWUPINSTRUCTIONS_ED_ALL_ED_FT
- Please bring all documentation from your ED visit to any related future follow up appointment.  - Please call to schedule follow up appointment with your primary care physician within 24-48 hours.  - Please seek immediate medical attention or return to the ED for any new/worsening, signs/symptoms, or concerns

## 2023-04-08 NOTE — ED PROVIDER NOTE - CLINICAL SUMMARY MEDICAL DECISION MAKING FREE TEXT BOX
43yo M p/w numbness and shooting electric feeling in dorsum hand. pt believes sx caused by work injury. on PE, FROM of wrist/hand/fingers, neuro intact, pulses intact, no skin changes. VSS. ordered motrin. pending XR 41yo M p/w numbness and shooting electric feeling in dorsum hand. pt believes sx caused by work injury. on PE, FROM of wrist/hand/fingers, neuro intact, pulses intact, no skin changes. VSS. ordered Motrin and Prednisone. pending XR- neg for fx/DL or acute change. referred to neuro and hand surg. discussed supportive care measures and return precautions. pt verbalized understanding and agreement

## 2023-04-08 NOTE — ED ADULT NURSE NOTE - CAS ELECT INFOMATION PROVIDED
DC instructions given by the Provider and verbalized understanding. Pt remains alert and O x4. NAD noted. Resp E/U./DC instructions

## 2023-04-08 NOTE — ED PROVIDER NOTE - CARE PROVIDER_API CALL
Keaton Salter)  Orthopedics  [address], [address]  [place], [place] [id number]  Phone: [phone]  Fax: [phone]  Follow Up Time:     Quang Rivas)  Neurology  [address] [facility] [address] [facility] [address]  [place] [address]  Phone: [phone]  Fax: [phone]  Follow Up Time:

## 2023-04-08 NOTE — ED PROVIDER NOTE - NS ED ATTENDING STATEMENT MOD
oral
This was a shared visit with the RONNA. I reviewed and verified the documentation and independently performed the documented:
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
## 2023-04-09 PROCEDURE — 73110 X-RAY EXAM OF WRIST: CPT | Mod: 26,RT

## 2023-04-09 PROCEDURE — 73130 X-RAY EXAM OF HAND: CPT | Mod: 26,RT

## 2023-11-10 NOTE — ED PROVIDER NOTE - IV ALTEPLASE INCLUSION HIDDEN
Monitor: The problem is stable.  Evaluation: No labs/tests required today.  Assessment/Treatment:  Continue current treatment/monitoring regimen. Anticoagulation with Eliquis.   show

## 2023-12-17 ENCOUNTER — NON-APPOINTMENT (OUTPATIENT)
Age: 43
End: 2023-12-17

## 2023-12-20 NOTE — ED ADULT TRIAGE NOTE - HEIGHT IN CM
- Patient had colonoscopy in 2020 which had multiple benign-appearing polyps in the transverse, descending, and sigmoid colon  - At the previous colonoscopy, he was advised to repeat colonoscopy in 2 years, which she has not done yet  - Patient is due for repeat colonoscopy  - Discussed the risks and benefits of colonoscopy including but not limited to infection, bleeding, perforation in addition to risks of sedation  - Patient verbalized understanding and is willing to proceed with colonoscopy  - Currently scheduling for 2/20/2024  - Colonoscopy prep sent to his pharmacy [phone]

## 2024-02-06 NOTE — ED PROVIDER NOTE - MUSCULOSKELETAL, MLM
I attest to the pharmacy technician's note.    Carroll Adkins, PharmD  [facility] Clinical Pharmacist  [phone]   Spine appears normal, left foot 2nd phalanx with mild erythema and edema at distal joint and TTP , Cr less than 3 sec , able to move and sensation grossly intact , no ant foot and  bony on ankle TTP

## 2024-05-13 ENCOUNTER — EMERGENCY (EMERGENCY)
Facility: HOSPITAL | Age: 44
LOS: 1 days | Discharge: HOME | End: 2024-05-13
Attending: STUDENT IN AN ORGANIZED HEALTH CARE EDUCATION/TRAINING PROGRAM
Payer: COMMERCIAL

## 2024-05-13 VITALS
WEIGHT: 250 LBS | RESPIRATION RATE: 18 BRPM | SYSTOLIC BLOOD PRESSURE: 159 MMHG | HEART RATE: 73 BPM | DIASTOLIC BLOOD PRESSURE: 94 MMHG | OXYGEN SATURATION: 98 % | TEMPERATURE: 98 F

## 2024-05-13 PROCEDURE — 96372 THER/PROPH/DIAG INJ SC/IM: CPT

## 2024-05-13 PROCEDURE — 99283 EMERGENCY DEPT VISIT LOW MDM: CPT

## 2024-05-13 PROCEDURE — 99053 MED SERV 10PM-8AM 24 HR FAC: CPT

## 2024-05-13 PROCEDURE — 99284 EMERGENCY DEPT VISIT MOD MDM: CPT

## 2024-05-13 RX ORDER — METHOCARBAMOL 500 MG/1
2 TABLET, FILM COATED ORAL
Refills: 0
Start: 2024-05-13

## 2024-05-13 RX ORDER — LIDOCAINE 4 G/100G
1 CREAM TOPICAL ONCE
Refills: 0 | Status: COMPLETED | OUTPATIENT
Start: 2024-05-13 | End: 2024-05-13

## 2024-05-13 RX ORDER — IBUPROFEN 200 MG
1 TABLET ORAL
Qty: 20 | Refills: 0
Start: 2024-05-13 | End: 2024-05-17

## 2024-05-13 RX ORDER — KETOROLAC TROMETHAMINE 30 MG/ML
30 SYRINGE (ML) INJECTION ONCE
Refills: 0 | Status: DISCONTINUED | OUTPATIENT
Start: 2024-05-13 | End: 2024-05-13

## 2024-05-13 RX ORDER — METHOCARBAMOL 500 MG/1
2 TABLET, FILM COATED ORAL
Qty: 30 | Refills: 0
Start: 2024-05-13 | End: 2024-05-17

## 2024-05-13 RX ADMIN — LIDOCAINE 1 PATCH: 4 CREAM TOPICAL at 02:24

## 2024-05-13 RX ADMIN — Medication 30 MILLIGRAM(S): at 02:23

## 2024-05-13 NOTE — ED PROVIDER NOTE - NSFOLLOWUPCLINICS_GEN_ALL_ED_FT
[facility]  Orthopedics  [address] [place] [address]  Phone: [phone]  Fax:

## 2024-05-13 NOTE — ED PROVIDER NOTE - PATIENT PORTAL LINK FT
You can access the FollowMyHealth Patient Portal offered by [facility] by registering at the following website: [email or web address]. By joining [employer]’s FollowMyHealth portal, you will also be able to view your health information using other applications (apps) compatible with our system.

## 2024-05-13 NOTE — ED PROVIDER NOTE - ATTENDING APP SHARED VISIT CONTRIBUTION OF CARE
I, Kirby Blackman, personally saw the patient with the PA, and completed the key components of the history and physical exam. I then discussed the management plan with the PA.

## 2024-05-13 NOTE — ED PROVIDER NOTE - NSFOLLOWUPINSTRUCTIONS_ED_ALL_ED_FT
Follow up with PCP within 1-2 days   Take muscle relaxant as needed   Take motrin every 6 hours for pain     return if new or worsening symptoms     Back Pain    WHAT YOU NEED TO KNOW:    Back pain is common. It can be caused by many conditions, such as arthritis or the breakdown of spinal discs. Your risk for back pain is increased by injuries, lack of activity, or repeated bending and twisting. You may feel sore or stiff on one or both sides of your back. The pain may spread to your buttocks or thighs.    DISCHARGE INSTRUCTIONS:    Return to the emergency department if:     You have pain, numbness, or weakness in one or both legs.      Your pain becomes so severe that you cannot walk.      You cannot control your urine or bowel movements.      You have severe back pain with chest pain.      You have severe back pain, nausea, and vomiting.      You have severe back pain that spreads to your side or genital area.    Contact your healthcare provider if:     You have back pain that does not get better with rest and pain medicine.      You have a fever.      You have pain that worsens when you are on your back or when you rest.      You have pain that worsens when you cough or sneeze.      You lose weight without trying.      You have questions or concerns about your condition or care.    Medicines:     NSAIDs help decrease swelling and pain. This medicine is available with or without a doctor's order. NSAIDs can cause stomach bleeding or kidney problems in certain people. If you take blood thinner medicine, always ask your healthcare provider if NSAIDs are safe for you. Always read the medicine label and follow directions.      Acetaminophen decreases pain and fever. It is available without a doctor's order. Ask how much to take and how often to take it. Follow directions. Read the labels of all other medicines you are using to see if they also contain acetaminophen, or ask your doctor or pharmacist. Acetaminophen can cause liver damage if not taken correctly. Do not use more than 4 grams (4,000 milligrams) total of acetaminophen in one day.       Muscle relaxers help decrease muscle spasms and back pain.      Prescription pain medicine may be given. Ask your healthcare provider how to take this medicine safely. Some prescription pain medicines contain acetaminophen. Do not take other medicines that contain acetaminophen without talking to your healthcare provider. Too much acetaminophen may cause liver damage. Prescription pain medicine may cause constipation. Ask your healthcare provider how to prevent or treat constipation.       Take your medicine as directed. Contact your healthcare provider if you think your medicine is not helping or if you have side effects. Tell him or her if you are allergic to any medicine. Keep a list of the medicines, vitamins, and herbs you take. Include the amounts, and when and why you take them. Bring the list or the pill bottles to follow-up visits. Carry your medicine list with you in case of an emergency.    How to manage your back pain:     Apply ice on your back for 15 to 20 minutes every hour or as directed. Use an ice pack, or put crushed ice in a plastic bag. Cover it with a towel before you apply it to your skin. Ice helps prevent tissue damage and decreases pain.      Apply heat on your back for 20 to 30 minutes every 2 hours for as many days as directed. Heat helps decrease pain and muscle spasms.      Stay active as much as you can without causing more pain. Bed rest could make your back pain worse. Avoid heavy lifting until your pain is gone.      Go to physical therapy as directed. A physical therapist can teach you exercises to help improve movement and strength, and to decrease pain.    Follow up with your healthcare provider in 2 weeks, or as directed: Write down your questions so you remember to ask them during your visits.

## 2024-05-13 NOTE — ED PROVIDER NOTE - OBJECTIVE STATEMENT
43 year old male with no med hx presented to ED c/o back pain. Pt states stepped thew wrong way down 1 step, felt pain radiate from low back up to back. worse with movement. denies fever/chills, numbness, tingling, chest pain, sob, falls.

## 2025-06-19 NOTE — ED ADULT TRIAGE NOTE - PRO INTERPRETER NEED 2
English
Is This A New Presentation, Or A Follow-Up?: Skin Lesions
Has Your Skin Lesion Been Treated?: not been treated